# Patient Record
Sex: MALE | Race: BLACK OR AFRICAN AMERICAN | Employment: FULL TIME | URBAN - METROPOLITAN AREA
[De-identification: names, ages, dates, MRNs, and addresses within clinical notes are randomized per-mention and may not be internally consistent; named-entity substitution may affect disease eponyms.]

---

## 2023-04-05 ENCOUNTER — HOSPITAL ENCOUNTER (EMERGENCY)
Age: 23
Discharge: HOME | End: 2023-04-05
Attending: EMERGENCY MEDICINE
Payer: COMMERCIAL

## 2023-04-05 VITALS
BODY MASS INDEX: 22.15 KG/M2 | WEIGHT: 125 LBS | SYSTOLIC BLOOD PRESSURE: 103 MMHG | TEMPERATURE: 98.8 F | DIASTOLIC BLOOD PRESSURE: 49 MMHG | HEART RATE: 84 BPM | RESPIRATION RATE: 18 BRPM | OXYGEN SATURATION: 97 % | HEIGHT: 63 IN

## 2023-04-05 DIAGNOSIS — J06.9 VIRAL URI: Primary | ICD-10-CM

## 2023-04-05 LAB
FLUAV RNA SPEC QL NAA+PROBE: NOT DETECTED
FLUBV RNA SPEC QL NAA+PROBE: NOT DETECTED
SARS-COV-2 RDRP RESP QL NAA+PROBE: NOT DETECTED
SOURCE: NORMAL

## 2023-04-05 PROCEDURE — 87502 INFLUENZA DNA AMP PROBE: CPT

## 2023-04-05 PROCEDURE — 87635 SARS-COV-2 COVID-19 AMP PRB: CPT

## 2023-04-05 PROCEDURE — 99283 EMERGENCY DEPT VISIT LOW MDM: CPT

## 2023-04-05 ASSESSMENT — PAIN SCALES - GENERAL: PAINLEVEL_OUTOF10: 6

## 2023-04-05 ASSESSMENT — PAIN - FUNCTIONAL ASSESSMENT: PAIN_FUNCTIONAL_ASSESSMENT: 0-10

## 2023-04-05 NOTE — ED NOTES
I have reviewed discharge instructions with the patient. The patient verbalized understanding. Patient left ED via Discharge Method: ambulatory to Home with self. Opportunity for questions and clarification provided. Patient given 0 scripts. To continue your aftercare when you leave the hospital, you may receive an automated call from our care team to check in on how you are doing. This is a free service and part of our promise to provide the best care and service to meet your aftercare needs.  If you have questions, or wish to unsubscribe from this service please call [phone]. Thank you for Choosing our [facility] Emergency Department.         Neal Molina RN  04/05/23 [id number]

## 2023-04-05 NOTE — ED PROVIDER NOTES
Emergency Department Provider Note       PCP: None Provider   Age: 23 y.o. Sex: male     DISPOSITION Decision To Discharge 04/05/2023 01:54:21 AM       ICD-10-CM    1. Viral URI  J06.9           Medical Decision Making     Complexity of Problems Addressed:  Complexity of Problem: 1 acute, uncomplicated illness or injury. Data Reviewed and Analyzed:  Category 1:   I independently ordered and reviewed each unique test.         Category 2:       Category 3: Discussion of management or test interpretation. Well-appearing. Negative COVID and flu. Afebrile. Likely viral.       Risk of Complications and/or Morbidity of Patient Management:      History     Breana Rodriguez is a 23 y.o. male who presents to the Emergency Department with chief complaint of    Chief Complaint   Patient presents with    Congestion      53-year-old male presents with 2 days of headache, nasal congestion, subjective fever. No vomiting, abdominal pain, diarrhea, cough, shortness of breath. Has not tried any over-the-counter medication. Physical Exam     Vitals signs and nursing note reviewed. Patient Vitals for the past 4 hrs:   Temp Pulse Resp BP SpO2   04/05/23 0014 98.8 °F (37.1 °C) 84 18 (!) 103/49 97 %        Physical Exam  Vitals and nursing note reviewed. Constitutional:       Appearance: Normal appearance. HENT:      Head: Normocephalic and atraumatic. Right Ear: Tympanic membrane normal.      Left Ear: Tympanic membrane normal.      Nose: Congestion present. Mouth/Throat:      Mouth: Mucous membranes are moist.      Pharynx: Oropharynx is clear. No oropharyngeal exudate. Eyes:      Extraocular Movements: Extraocular movements intact. Pupils: Pupils are equal, round, and reactive to light. Cardiovascular:      Rate and Rhythm: Normal rate and regular rhythm. Pulmonary:      Effort: Pulmonary effort is normal. No respiratory distress. Abdominal:      General: Abdomen is flat.  There is no

## 2023-04-05 NOTE — ED TRIAGE NOTES
Pt. A/ox4 and ambulatory to triage. Pt. C/o headache. Congestion and scratchy throat starting yesterday.

## 2023-04-05 NOTE — Clinical Note
Latasha Shi was seen and treated in our emergency department on 4/5/2023. He may return to work on 04/06/2023. If you have any questions or concerns, please don't hesitate to call.       Caron Melvin MD